# Patient Record
Sex: FEMALE | Race: BLACK OR AFRICAN AMERICAN | NOT HISPANIC OR LATINO | Employment: FULL TIME | URBAN - METROPOLITAN AREA
[De-identification: names, ages, dates, MRNs, and addresses within clinical notes are randomized per-mention and may not be internally consistent; named-entity substitution may affect disease eponyms.]

---

## 2018-02-02 PROBLEM — I50.21 ACUTE SYSTOLIC CONGESTIVE HEART FAILURE: Status: ACTIVE | Noted: 2018-02-02

## 2018-02-03 PROBLEM — R06.02 SOB (SHORTNESS OF BREATH): Status: ACTIVE | Noted: 2018-02-03

## 2018-02-03 PROBLEM — I50.21 ACUTE SYSTOLIC CONGESTIVE HEART FAILURE: Status: RESOLVED | Noted: 2018-02-02 | Resolved: 2018-02-03

## 2018-02-03 PROBLEM — J81.0 ACUTE PULMONARY EDEMA: Status: ACTIVE | Noted: 2018-02-03

## 2018-02-03 PROBLEM — R05.9 COUGH: Status: ACTIVE | Noted: 2018-02-03

## 2018-03-15 PROBLEM — I51.7 LVH (LEFT VENTRICULAR HYPERTROPHY): Status: ACTIVE | Noted: 2018-03-15

## 2018-03-15 PROBLEM — I42.0 DILATED CARDIOMYOPATHY: Status: ACTIVE | Noted: 2018-03-15

## 2018-03-15 PROBLEM — R94.31 PROLONGED Q-T INTERVAL ON ECG: Status: ACTIVE | Noted: 2018-03-15

## 2018-03-15 PROBLEM — I50.20 SYSTOLIC HEART FAILURE: Status: ACTIVE | Noted: 2018-02-02

## 2018-03-15 PROBLEM — I37.1 PULMONARY VALVE INSUFFICIENCY: Status: ACTIVE | Noted: 2018-03-15

## 2018-03-15 PROBLEM — I51.7 LEFT ATRIAL ENLARGEMENT: Status: ACTIVE | Noted: 2018-03-15

## 2018-03-15 PROBLEM — Z82.49 FAMILY HISTORY OF HEART DISEASE: Status: ACTIVE | Noted: 2018-03-15

## 2018-03-15 PROBLEM — E66.01 MORBID OBESITY WITH BMI OF 45.0-49.9, ADULT: Status: ACTIVE | Noted: 2018-03-15

## 2018-05-07 PROBLEM — J81.0 ACUTE PULMONARY EDEMA: Status: RESOLVED | Noted: 2018-02-03 | Resolved: 2018-05-07

## 2018-05-10 ENCOUNTER — CLINICAL SUPPORT (OUTPATIENT)
Dept: OCCUPATIONAL MEDICINE | Facility: CLINIC | Age: 23
End: 2018-05-10

## 2018-05-10 DIAGNOSIS — Z11.1 PPD SCREENING TEST: Primary | ICD-10-CM

## 2018-05-10 PROCEDURE — 86580 TB INTRADERMAL TEST: CPT | Mod: S$GLB,,,

## 2018-05-10 NOTE — PROGRESS NOTES
05/10/2018-- 1:20---PPD Skin Test to left forearm. Pt tolerated well. Informed Pt. To return in 2 days for reading

## 2018-06-14 PROBLEM — R06.09 DYSPNEA ON EXERTION: Status: ACTIVE | Noted: 2018-02-03

## 2018-08-09 ENCOUNTER — TELEPHONE (OUTPATIENT)
Dept: ADMINISTRATIVE | Facility: HOSPITAL | Age: 23
End: 2018-08-09

## 2019-02-20 PROBLEM — I35.1 NONRHEUMATIC AORTIC VALVE INSUFFICIENCY: Status: ACTIVE | Noted: 2019-02-20

## 2019-02-20 PROBLEM — I34.0 NON-RHEUMATIC MITRAL REGURGITATION: Status: ACTIVE | Noted: 2019-02-20

## 2019-10-05 PROBLEM — B37.31 VAGINAL CANDIDIASIS: Status: ACTIVE | Noted: 2019-10-05

## 2019-10-06 PROBLEM — I50.9 CHF (CONGESTIVE HEART FAILURE): Status: ACTIVE | Noted: 2018-02-02

## 2019-10-11 PROBLEM — I51.89 LEFT VENTRICULAR SYSTOLIC DYSFUNCTION: Status: RESOLVED | Noted: 2019-10-11 | Resolved: 2019-10-11

## 2019-10-11 PROBLEM — I50.20 HEART FAILURE WITH REDUCED EJECTION FRACTION, NYHA CLASS I: Status: ACTIVE | Noted: 2019-10-11

## 2019-10-11 PROBLEM — R06.09 DYSPNEA ON EXERTION: Status: RESOLVED | Noted: 2018-02-03 | Resolved: 2019-10-11

## 2019-10-11 PROBLEM — I51.7 LEFT VENTRICULAR ENLARGEMENT: Status: ACTIVE | Noted: 2019-10-11

## 2019-10-11 PROBLEM — I50.9 CHF (CONGESTIVE HEART FAILURE): Status: RESOLVED | Noted: 2018-02-02 | Resolved: 2019-10-11

## 2019-10-11 PROBLEM — I51.89 LEFT VENTRICULAR SYSTOLIC DYSFUNCTION: Status: ACTIVE | Noted: 2019-10-11

## 2019-10-11 PROBLEM — I07.1 TRICUSPID VALVE INSUFFICIENCY: Status: ACTIVE | Noted: 2019-10-11

## 2019-10-11 PROBLEM — E66.01 MORBID OBESITY: Status: ACTIVE | Noted: 2019-10-11

## 2019-10-11 PROBLEM — I51.7 RIGHT VENTRICULAR ENLARGEMENT: Status: ACTIVE | Noted: 2019-10-11

## 2019-10-11 PROBLEM — I51.9 LEFT VENTRICULAR SYSTOLIC DYSFUNCTION: Status: ACTIVE | Noted: 2019-10-11

## 2019-10-11 PROBLEM — I51.9 LEFT VENTRICULAR SYSTOLIC DYSFUNCTION: Status: RESOLVED | Noted: 2019-10-11 | Resolved: 2019-10-11

## 2019-11-06 ENCOUNTER — HOSPITAL ENCOUNTER (OUTPATIENT)
Dept: RADIOLOGY | Facility: HOSPITAL | Age: 24
Discharge: HOME | End: 2019-11-06
Attending: INTERNAL MEDICINE
Payer: MEDICAID

## 2019-11-06 DIAGNOSIS — I42.0 DILATED CARDIOMYOPATHY: ICD-10-CM

## 2019-11-06 DIAGNOSIS — I42.0 DILATED CARDIOMYOPATHY: Primary | ICD-10-CM

## 2019-11-06 DIAGNOSIS — I42.8 CARDIOMYOPATHY, NONISCHEMIC: ICD-10-CM

## 2019-11-06 PROCEDURE — 25500020 PHARM REV CODE 255: Performed by: INTERNAL MEDICINE

## 2019-11-06 PROCEDURE — A9577 INJ MULTIHANCE: HCPCS | Performed by: INTERNAL MEDICINE

## 2019-11-06 PROCEDURE — 75561 CARDIAC MRI FOR MORPH W/DYE: CPT | Mod: 26,,, | Performed by: PEDIATRICS

## 2019-11-06 PROCEDURE — 75561 MRI CARDIAC MORPHOLOGY FUNCTION W WO: ICD-10-PCS | Mod: 26,,, | Performed by: PEDIATRICS

## 2019-11-06 PROCEDURE — 75561 CARDIAC MRI FOR MORPH W/DYE: CPT | Mod: TC

## 2019-11-06 RX ADMIN — GADOBENATE DIMEGLUMINE 20 ML: 529 INJECTION, SOLUTION INTRAVENOUS at 12:11

## 2020-01-23 ENCOUNTER — OFFICE VISIT (OUTPATIENT)
Dept: URGENT CARE | Facility: CLINIC | Age: 25
End: 2020-01-23
Payer: MEDICAID

## 2020-01-23 VITALS
OXYGEN SATURATION: 98 % | WEIGHT: 270 LBS | SYSTOLIC BLOOD PRESSURE: 155 MMHG | HEIGHT: 62 IN | BODY MASS INDEX: 49.69 KG/M2 | RESPIRATION RATE: 18 BRPM | TEMPERATURE: 99 F | HEART RATE: 106 BPM | DIASTOLIC BLOOD PRESSURE: 104 MMHG

## 2020-01-23 DIAGNOSIS — B96.89 BACTERIAL SINUSITIS: Primary | ICD-10-CM

## 2020-01-23 DIAGNOSIS — J32.9 BACTERIAL SINUSITIS: Primary | ICD-10-CM

## 2020-01-23 PROCEDURE — 99204 OFFICE O/P NEW MOD 45 MIN: CPT | Mod: S$GLB,,, | Performed by: NURSE PRACTITIONER

## 2020-01-23 PROCEDURE — 99204 PR OFFICE/OUTPT VISIT, NEW, LEVL IV, 45-59 MIN: ICD-10-PCS | Mod: S$GLB,,, | Performed by: NURSE PRACTITIONER

## 2020-01-23 RX ORDER — PREDNISONE 10 MG/1
10 TABLET ORAL DAILY
Qty: 5 TABLET | Refills: 0 | Status: SHIPPED | OUTPATIENT
Start: 2020-01-23 | End: 2020-01-28

## 2020-01-23 RX ORDER — AMOXICILLIN AND CLAVULANATE POTASSIUM 875; 125 MG/1; MG/1
1 TABLET, FILM COATED ORAL 2 TIMES DAILY
Qty: 20 TABLET | Refills: 0 | Status: SHIPPED | OUTPATIENT
Start: 2020-01-23 | End: 2020-02-02

## 2020-01-23 NOTE — PATIENT INSTRUCTIONS
Sinusitis (Antibiotic Treatment)    The sinuses are air-filled spaces within the bones of the face. They connect to the inside of the nose. Sinusitis is an inflammation of the tissue lining the sinus cavity. Sinus inflammation can occur during a cold. It can also be due to allergies to pollens and other particles in the air. Sinusitis can cause symptoms of sinus congestion and fullness. A sinus infection causes fever, headache and facial pain. There is often green or yellow drainage from the nose or into the back of the throat (post-nasal drip). You have been given antibiotics to treat this condition.  Home care:  · Take the full course of antibiotics as instructed. Do not stop taking them, even if you feel better.  · Drink plenty of water, hot tea, and other liquids. This may help thin mucus. It also may promote sinus drainage.  · Heat may help soothe painful areas of the face. Use a towel soaked in hot water. Or,  the shower and direct the hot spray onto your face. Using a vaporizer along with a menthol rub at night may also help.   · An expectorant containing guaifenesin may help thin the mucus and promote drainage from the sinuses.  · Over-the-counter decongestants may be used unless a similar medicine was prescribed. Nasal sprays work the fastest. Use one that contains phenylephrine or oxymetazoline. First blow the nose gently. Then use the spray. Do not use these medicines more often than directed on the label or symptoms may get worse. You may also use tablets containing pseudoephedrine. Avoid products that combine ingredients, because side effects may be increased. Read labels. You can also ask the pharmacist for help. (NOTE: Persons with high blood pressure should not use decongestants. They can raise blood pressure.)  · Over-the-counter antihistamines may help if allergies contributed to your sinusitis.    · Do not use nasal rinses or irrigation during an acute sinus infection, unless told to by  your health care provider. Rinsing may spread the infection to other sinuses.  · Use acetaminophen or ibuprofen to control pain, unless another pain medicine was prescribed. (If you have chronic liver or kidney disease or ever had a stomach ulcer, talk with your doctor before using these medicines. Aspirin should never be used in anyone under 18 years of age who is ill with a fever. It may cause severe liver damage.)  · Don't smoke. This can worsen symptoms.  Follow-up care  Follow up with your healthcare provider or our staff if you are not improving within the next week.  When to seek medical advice  Call your healthcare provider if any of these occur:  · Facial pain or headache becoming more severe  · Stiff neck  · Unusual drowsiness or confusion  · Swelling of the forehead or eyelids  · Vision problems, including blurred or double vision  · Fever of 100.4ºF (38ºC) or higher, or as directed by your healthcare provider  · Seizure  · Breathing problems  · Symptoms not resolving within 10 days  Date Last Reviewed: 4/13/2015  © 7585-9984 [employer] [address] [place] [address]. All rights reserved. This information is not intended as a substitute for professional medical care. Always follow your healthcare professional's instructions.      Upper Respiratory Infections    The common cold/ viral upper respiratory infection is an acute, self-limiting infection of the upper respiratory tract characterized by variable degrees of sneezing, nasal congestion and discharge (rhinorrhea), sore throat, cough, low grade fever, headache, and malaise.    Symptoms usually peak on day 2 to 3 of illness and then gradually improve over 7 to 14 days.  A cough may linger for 3 to 4 weeks but should steadily improve over time.       The following information is provided to help you in treating upper respiratory infections.    Decongestant Nasal Sprays  Over-the-counter decongestant nasal spray such as Afrin,  may be helpful as an initial step in treating upper respiratory infections. This spray can be used for up to approximately 3 days and is used no more than twice per day. Topical nasal decongestant spray for longer than 5 days will result in a physical addiction, in which the nasal lining will become significantly swollen and irritated until the spray is used again.     Nasal Saline  Nasal saline is available over the counter. There are several different commercial preparations such as Ocean spray and Ayr spray. There is no limit on the use of Nasal saline. Saline is used by snorting the mist up into the nose then later gently blowing the nose to get rid of any secretions that it has loosened.    Nasal Steroids  Nasal steroid medications such as Flonase are useful for upper respiratory infections, allergies, and sensitivities to airborne irritants. Unfortunately, they do not begin to work for 1-2 days, and they do not reach their maximum benefit for approximately 2-3 weeks. Initial therapy is typically 2 puffs per nostril twice per day. This should be used for only a few days, then the maintenance dosage is one or two puffs per nostril once per day. This can be done at any time of the day. The most effective way to use any nasal medication is to look down at your toes when spraying it in. Aim slightly away from the septum (dividing plate between the nostrils), and gently inhale. This ensures that the spray will go into the sinus cavities and not straight up into the nose. A good way to avoid spraying onto the septum is to use the right hand to spray into the left nostril, and vice versa for the right nostril. Occasionally, nasal steroids can increase the risk of nosebleeds, but in general they are very well tolerated and effective medications.    Antihistamines  Antihistamines are available both over-the-counter and as a prescription. There are also various decongestant and antihistamine combinations available such  as Claritin, Allegra, and Zyrtec. It is best to take any antihistamine-decongestant combination in the morning to avoid insomnia. Zyrtec should probably be taken at night, in order to reduce the chance of sleepiness during the daytime. If there is a significant infection present and secretions are already thickened, it is recommended to discontinue antihistamines and use a mucous thinner/decongestant combination.    Mucous Thinners and Decongestants  Mucous thinners and decongestants are used to shrink down the tissues and promote sinus drainage. There are multiple prescription and over-the-counter varieties available. A mucous thinner will tend to be drying unless you are also drinking plenty of water when taking these. If you have high blood pressure, it is very important to monitor your pressure while on decongestants. The mucous thinner/decongestant combinations are typically given twice per day. However, some people will be unable to tolerate these at night and should only take them once per day.    Oral Steroids  Oral steroids can be used with more sever infections. Often, they are the only medications that will reduce the symptoms of pressure and allow the nasal sinuses to drain. These are best taken on a full stomach and earlier in the day is better. They may give you some irritability, stomach upset, or hyperactivity. This can also interfere with sleep. A person who has high blood pressure or diabetes needs to be very careful to monitor their pressure or blood glucose while taking steroids. Steroids can have multiple side effects when taken long-term, but short-term doses are very well tolerated and extremely effective in controlling the symptoms associated with acute and chronic sinus infections, severe allergies, or nasal polyps. The only significant side effect of note with oral steroids is the extraordinarily uncommon occurrence of damage to the hip cartilage, which is very rare and is usually  associated with long-term usage of steroids. The use of steroids for greater than approximately seven days requires a tapering down in order to discontinue them. You should not abruptly stop your steroid if you have been taking the same dose for greater than one week.    Antibiotic Treatment  Finally, when all of these other measures have failed, and a bacterial infection is present, an antibiotic will be prescribed. The most common symptoms of acute sinusitis of a bacterial nature are pain, pressure, and thick and colored nasal drainage. However, not all colored drainage means that there is a bacterial infection present. According to the Center for Disease Control, only 2% of colds will progress to result in bacterial sinusitis. Most upper respiratory infections should NOT be treated with antibiotics. Antibiotics should be reserved for upper respiratory infections which last longer than 10 days, or which worsen after 4 or 5 days of treatment. The use of antibiotics for nonbacterial upper respiratory infections has resulted in a severe problem with the emergence of bacteria which are resistant to multiple forms of antibiotics, and some bacteria are currently only treatable with intravenous antibiotics.    Body Aches/Pains/Fever  For patients who are not allergic to and are not on anticoagulants, you can alternate Tylenol and Motrin every 4-6 hours for fever above 100.4F and/or pain.  For patients who are allergic or intolerant to NSAIDS, have gastritis, gastric ulcers, or history of GI bleeds, are pregnant, or are on anticoagulant therapy, you can take Tylenol every 4 hours as needed for fever above 100.4F and/or pain.     Maintain adequate hydration -  Rest and keep yourself/patient well hydrated. For adults, it is recommended to drink at least 8-10 glasses of water daily.  This may help thin secretions and soothe the respiratory mucosa.     Drink Hot Liquids (coffee, water, tea, hot chocolate or soup). Put liquid  in a Mug and place in Microwave for 2 to 3 minutes. CHANGE THE CUP THAT WAS USED IN THE MICROWAVE SO AS NOT TO BURN YOUR MOUTH.  Sniff the steam from the cup and sip the heated liquid TEN TO TWELVE TIMES A DAY for 5 to 7 Days.    COUGH  A viral cough may linger for 3 to 4 weeks but should steadily improve over time.   Coughing is the body's natural way to clear mucus and help get rid of bacteria and viruses. Therefore, cough suppressants are usually not recomended.  Common cough suppressants include syrups with the ingredient dextromethorphan or DM, available over-the-counter, or prescription syrups containing codeine or hydrocone.  There is no proven benefit and have potential harms.       ?Honey may be beneficial, especially on nocturnal cough.   1 to 2 teaspoons can be taken straight or diluted in tea, juice or other liquid.    The antioxidants in honey are an important contributor to its decongestant properties. Generally speaking, darker honey contains more antioxidants. Buckwheat and avocado honey are particularly good choices. If these honeys are not available in your area, choose the darkest honey you can find.        1.  Take all medications as directed. If you have been prescribed antibiotics, make sure to complete them.   2.  Rest and keep yourself/patient well hydrated. For adults, it is recommended to drink at least 8-10 glasses of water daily.   3.  For patients above 6 months of age who are not allergic to and are not on anticoagulants, you can alternate Tylenol and Motrin every 4-6 hours for fever above 100.4F and/or pain.  For patients less than 6 months of age, allergic to or intolerant to NSAIDS, have gastritis, gastric ulcers, or history of GI bleeds, are pregnant, or are on anticoagulant therapy, you can take Tylenol every 4 hours as needed for fever above 100.4F and/or pain.   4. You should schedule a follow-up appointment with your Primary Care Provider/Pediatrician for recheck in 2-3 days or  as directed at this visit.   5.  If your condition fails to improve in a timely manner, you should receive another evaluation by your Primary Care Provider/Pediatrician to discuss your concerns or return to urgent care for a recheck.  If your condition worsens at any time, you should report immediately to your nearest Emergency Department for further evaluation. **You must understand that you have received Urgent Care treatment only and that you may be released before all of your medical problems are known or treated. You, the patient, are responsible to arrange for follow-up care as instructed.

## 2020-01-23 NOTE — LETTER
January 23, 2020      [facility]  [place]  [address]  [place] [address]  Phone: [phone]  Fax: [phone]       Patient: Brittni Morin   YOB: 1995  Date of Visit: 01/23/2020    To Whom It May Concern:    Gayle Morin  was at [facility] on 01/23/2020. She may return to work/school on 1/24/20 with no restrictions. If you have any questions or concerns, or if I can be of further assistance, please do not hesitate to contact me.    Sincerely,    Princess Villanueva NP

## 2020-03-25 PROBLEM — E66.01 MORBID OBESITY: Status: RESOLVED | Noted: 2019-10-11 | Resolved: 2020-03-25

## 2020-03-25 PROBLEM — I42.8 CARDIOMYOPATHY, NONISCHEMIC: Status: ACTIVE | Noted: 2020-03-25

## 2020-03-25 PROBLEM — E66.01 CLASS 2 SEVERE OBESITY DUE TO EXCESS CALORIES WITH SERIOUS COMORBIDITY IN ADULT: Status: ACTIVE | Noted: 2020-03-25

## 2020-03-25 PROBLEM — E66.01 MORBID OBESITY WITH BMI OF 45.0-49.9, ADULT: Status: RESOLVED | Noted: 2018-03-15 | Resolved: 2020-03-25

## 2020-03-25 PROBLEM — E66.812 CLASS 2 SEVERE OBESITY DUE TO EXCESS CALORIES WITH SERIOUS COMORBIDITY IN ADULT: Status: ACTIVE | Noted: 2020-03-25

## 2020-07-21 ENCOUNTER — NURSE TRIAGE (OUTPATIENT)
Dept: ADMINISTRATIVE | Facility: CLINIC | Age: 25
End: 2020-07-21

## 2020-07-21 NOTE — TELEPHONE ENCOUNTER
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  She states that she has SOB on exertion and a stuffy nose.  Denies having a fever at this time.  Patient states that she is currently at work.  Advised patient to be seen at the urgent care or ER today.  Also advised patient to call her employer and inform them that she currently has symptoms. Patient states that she has no relief.  Patient further advised to be seen patient verbalized understanding.  Reiterated CDC guidelines.     Reason for Disposition   MILD difficulty breathing (e.g., minimal/no SOB at rest, SOB with walking, pulse <100)    Additional Information   Negative: SEVERE difficulty breathing (e.g., struggling for each breath, speaks in single words)   Negative: Difficult to awaken or acting confused (e.g., disoriented, slurred speech)   Negative: Bluish (or gray) lips or face now   Negative: Shock suspected (e.g., cold/pale/clammy skin, too weak to stand, low BP, rapid pulse)   Negative: Sounds like a life-threatening emergency to the triager   Negative: SEVERE or constant chest pain or pressure (Exception: mild central chest pain, present only when coughing)   Negative: MODERATE difficulty breathing (e.g., speaks in phrases, SOB even at rest, pulse 100-120)   Negative: Patient sounds very sick or weak to the triager    Protocols used: CORONAVIRUS (COVID-19) DIAGNOSED OR [id number]

## 2020-07-22 ENCOUNTER — NURSE TRIAGE (OUTPATIENT)
Dept: ADMINISTRATIVE | Facility: CLINIC | Age: 25
End: 2020-07-22

## 2020-07-22 ENCOUNTER — LAB VISIT (OUTPATIENT)
Dept: PRIMARY CARE CLINIC | Facility: OTHER | Age: 25
End: 2020-07-22
Attending: INTERNAL MEDICINE
Payer: COMMERCIAL

## 2020-07-22 DIAGNOSIS — R05.9 COUGH: ICD-10-CM

## 2020-07-22 DIAGNOSIS — Z03.818 ENCOUNTER FOR OBSERVATION FOR SUSPECTED EXPOSURE TO OTHER BIOLOGICAL AGENTS RULED OUT: ICD-10-CM

## 2020-07-22 DIAGNOSIS — R06.02 SHORTNESS OF BREATH: ICD-10-CM

## 2020-07-22 PROCEDURE — U0003 INFECTIOUS AGENT DETECTION BY NUCLEIC ACID (DNA OR RNA); SEVERE ACUTE RESPIRATORY SYNDROME CORONAVIRUS 2 (SARS-COV-2) (CORONAVIRUS DISEASE [COVID-19]), AMPLIFIED PROBE TECHNIQUE, MAKING USE OF HIGH THROUGHPUT TECHNOLOGIES AS DESCRIBED BY CMS-2020-01-R: HCPCS

## 2020-07-22 NOTE — TELEPHONE ENCOUNTER
Pt states she was in contact with a positive Covid pt. She is now experiencing mild SOB and cough for 3 days. No fever. No s/s of distress at this time. Pt would like to be tested. Advised about community testing sites, pt is understanding in next's steps in 14 day quarantine, pending test results.     Reason for Disposition   [1] Symptoms of COVID-19 (e.g., cough, fever, SOB, or others) AND [2] lives in an area with community spread   COVID-19 Testing, questions about    Additional Information   Negative: COVID-19 has been diagnosed by a healthcare provider (HCP)   Negative: COVID-19 lab test positive   Negative: SEVERE difficulty breathing (e.g., struggling for each breath, speaks in single words)   Negative: Difficult to awaken or acting confused (e.g., disoriented, slurred speech)   Negative: Bluish (or gray) lips or face now   Negative: Shock suspected (e.g., cold/pale/clammy skin, too weak to stand, low BP, rapid pulse)   Negative: Sounds like a life-threatening emergency to the triager   Negative: [1] COVID-19 exposure AND [2] NO symptoms   Negative: COVID-19 and Breastfeeding, questions about   Negative: [1] Adult with possible COVID-19 symptoms AND [2] triager concerned about severity of symptoms or other causes   Negative: SEVERE or constant chest pain or pressure (Exception: mild central chest pain, present only when coughing)   Negative: MODERATE difficulty breathing (e.g., speaks in phrases, SOB even at rest, pulse 100-120)   Negative: Patient sounds very sick or weak to the triager   Negative: Chest pain   Negative: MILD difficulty breathing (e.g., minimal/no SOB at rest, SOB with walking, pulse <100)   Negative: Fever > 103 F (39.4 C)   Negative: [1] Fever > 101 F (38.3 C) AND [2] age > 60   Negative: [1] Fever > 100.0 F (37.8 C) AND [2] bedridden (e.g., nursing home patient, CVA, chronic illness, recovering from surgery)   Negative: HIGH RISK patient (e.g., age > 64 years,  diabetes, heart or lung disease, weak immune system)   Negative: [1] COVID-19 infection suspected by caller or triager AND [2] mild symptoms (cough, fever, or others) AND [3] no complications or SOB   Negative: Fever present > 3 days (72 hours)   Negative: [1] Fever returns after gone for over 24 hours AND [2] symptoms worse or not improved   Negative: [1] Continuous (nonstop) coughing interferes with work or school AND [2] no improvement using cough treatment per protocol   Negative: Cough present > 3 weeks   Negative: [1] COVID-19 diagnosed by positive lab test AND [2] mild symptoms (e.g., cough, fever, others) AND [3] no complications or SOB   Negative: [1] COVID-19 diagnosed by HCP (doctor, NP or PA) AND [2] mild symptoms (e.g., cough, fever, others) AND [3] no complications or SOB   Negative: COVID-19 Home Isolation, questions about    Protocols used: CORONAVIRUS (COVID-19) EXPOSURE-A-OH, CORONAVIRUS (COVID-19) DIAGNOSED OR [id number]

## 2020-07-22 NOTE — TELEPHONE ENCOUNTER
Patient states that she has been in direct contact of Covid + people. Was tested today at a Community Site. States her employers are requiring a note for wok. States her PCP Office are not able to provide the letter. Requesting information to get a letter to return to work.  Advised [facility]. Understanding verbalized.    Reason for Disposition   COVID -19, questions about    Protocols used: CORONAVIRUS (COVID-19) - EXPOSURE-A-AH

## 2020-07-23 ENCOUNTER — OFFICE VISIT (OUTPATIENT)
Dept: URGENT CARE | Facility: CLINIC | Age: 25
End: 2020-07-23
Payer: COMMERCIAL

## 2020-07-23 VITALS
TEMPERATURE: 99 F | BODY MASS INDEX: 49.69 KG/M2 | SYSTOLIC BLOOD PRESSURE: 151 MMHG | WEIGHT: 270 LBS | HEART RATE: 107 BPM | DIASTOLIC BLOOD PRESSURE: 104 MMHG | RESPIRATION RATE: 18 BRPM | HEIGHT: 62 IN | OXYGEN SATURATION: 97 %

## 2020-07-23 DIAGNOSIS — Z20.822 CLOSE EXPOSURE TO COVID-19 VIRUS: Primary | ICD-10-CM

## 2020-07-23 DIAGNOSIS — Z71.89 EDUCATED ABOUT COVID-19 VIRUS INFECTION: ICD-10-CM

## 2020-07-23 PROCEDURE — 99214 PR OFFICE/OUTPT VISIT, EST, LEVL IV, 30-39 MIN: ICD-10-PCS | Mod: S$GLB,,, | Performed by: NURSE PRACTITIONER

## 2020-07-23 PROCEDURE — 99214 OFFICE O/P EST MOD 30 MIN: CPT | Mod: S$GLB,,, | Performed by: NURSE PRACTITIONER

## 2020-07-23 NOTE — LETTER
[address], [address] ? [place], [address] ? Phone [phone] ? Fax [phone]           Return to Work/School    Patient: Brittni Morin  YOB: 1995   Date: 07/23/2020      To Whom It May Concern:     Brittni Morin was in contact with/seen in my office on 07/23/2020. COVID-19 is present in our communities across the [facility]. Not all patients are eligible or appropriate to be tested. In this situation, your employee meets the following criteria:     Brittni Morin has met the criteria for COVID-19 testing ln 7/22/2020 based upon symptoms, travel, and/or potential exposure. The test has been completed and is pending results at this time. During this time the employee is not able to work and should be quarantined per the Centers for Disease Control timelines.      If you have any questions or concerns, or if I can be of further assistance, please do not hesitate to contact me.     Sincerely,        Lizz Baca NP

## 2020-07-24 NOTE — PATIENT INSTRUCTIONS
Follow up with your doctor in a few days.  Return to the urgent care or go to the ER if symptoms get worse.

## 2020-07-24 NOTE — PROGRESS NOTES
Pt comes in for a work note. Pt was tested for COVID19 on Wednesday at a [facility] site.

## 2020-07-25 DIAGNOSIS — U07.1 COVID-19 VIRUS DETECTED: ICD-10-CM

## 2020-07-25 LAB — SARS-COV-2 RNA RESP QL NAA+PROBE: POSITIVE

## 2020-07-25 NOTE — PROGRESS NOTES
"Subjective:       Patient ID: Brittni Morin is a 25 y.o. female.    Vitals:  height is 5' 2" (1.575 m) and weight is 122.5 kg (270 lb). Her temperature is 98.5 °F (36.9 °C). Her blood pressure is 151/104 (abnormal) and her pulse is 107. Her respiration is 18 and oxygen saturation is 97%.     Chief Complaint: Letter for School/Work    Sinus Problem  This is a new problem. The current episode started in the past 7 days. The problem is unchanged. There has been no fever. Associated symptoms include congestion, coughing and a sore throat. Pertinent negatives include no shortness of breath.       Constitution: Negative.   HENT: Positive for congestion and sore throat.    Neck: negative.   Cardiovascular: Negative.    Eyes: Negative.    Respiratory: Positive for cough. Negative for chest tightness, sputum production, COPD, shortness of breath and wheezing.    Gastrointestinal: Negative.    Genitourinary: Negative.    Musculoskeletal: Negative.    Allergic/Immunologic: Negative.    Neurological: Negative.        Objective:      Physical Exam   Constitutional:  Non-toxic appearance. She does not appear ill. No distress.   HENT:   Mouth/Throat: Posterior oropharyngeal erythema present. No oropharyngeal exudate or posterior oropharyngeal edema.   Pulmonary/Chest: No respiratory distress.   Skin: Skin is not diaphoretic.       Patient was seen limited PE due to state of emergency for the COVID-19 outbreak.    Assessment:       1. Close Exposure to Covid-19 Virus    2. Educated About Covid-19 Virus Infection        Plan:     patient concerned with mild allergy/sinus symptoms and positive close exposure. Testing pending from 7/22/2020- work requiring note stating she was tested.  Work note provided to quarantine while pending test results.       Close Exposure to Covid-19 Virus    Educated About Covid-19 Virus Infection           "

## 2020-07-29 ENCOUNTER — TELEPHONE (OUTPATIENT)
Dept: URGENT CARE | Facility: CLINIC | Age: 25
End: 2020-07-29

## 2020-07-30 NOTE — TELEPHONE ENCOUNTER
Covid19 positive.  Results given to the patient.  Patient completed 10 days of quarantine.  She is symptom free for more than 48 hr.  She can go back to work.

## 2021-05-04 ENCOUNTER — PATIENT MESSAGE (OUTPATIENT)
Dept: RESEARCH | Facility: HOSPITAL | Age: 26
End: 2021-05-04

## 2021-05-10 ENCOUNTER — PATIENT MESSAGE (OUTPATIENT)
Dept: RESEARCH | Facility: HOSPITAL | Age: 26
End: 2021-05-10

## 2024-01-04 ENCOUNTER — OCCUPATIONAL HEALTH (OUTPATIENT)
Dept: URGENT CARE | Facility: CLINIC | Age: 29
End: 2024-01-04

## 2024-01-04 VITALS
BODY MASS INDEX: 47.34 KG/M2 | HEART RATE: 102 BPM | HEIGHT: 63 IN | OXYGEN SATURATION: 96 % | SYSTOLIC BLOOD PRESSURE: 134 MMHG | TEMPERATURE: 98 F | DIASTOLIC BLOOD PRESSURE: 91 MMHG | RESPIRATION RATE: 16 BRPM | WEIGHT: 267.19 LBS

## 2024-01-04 DIAGNOSIS — Z00.00 ENCOUNTER FOR PHYSICAL EXAMINATION: Primary | ICD-10-CM

## 2024-01-04 LAB
CTP QC/QA: YES
GLUCOSE SERPL-MCNC: 93 MG/DL (ref 70–110)
POC 10 PANEL DRUG SCREEN: NEGATIVE

## 2024-01-04 PROCEDURE — 99499 UNLISTED E&M SERVICE: CPT | Mod: S$GLB,,, | Performed by: PHYSICIAN ASSISTANT

## 2024-01-04 PROCEDURE — 82962 GLUCOSE BLOOD TEST: CPT | Mod: S$GLB,,, | Performed by: PHYSICIAN ASSISTANT

## 2024-01-04 PROCEDURE — 80305 DRUG TEST PRSMV DIR OPT OBS: CPT | Mod: S$GLB,,, | Performed by: PHYSICIAN ASSISTANT

## 2025-04-22 ENCOUNTER — LAB VISIT (OUTPATIENT)
Dept: LAB | Facility: HOSPITAL | Age: 30
End: 2025-04-22
Attending: EMERGENCY MEDICINE

## 2025-04-22 DIAGNOSIS — Z02.1 ENCOUNTER FOR PRE-EMPLOYMENT EXAMINATION: ICD-10-CM

## 2025-04-22 LAB
HBV SURFACE AB SER-ACNC: <3 MIU/ML
HBV SURFACE AB SERPL IA-ACNC: NORMAL M[IU]/ML

## 2025-04-22 PROCEDURE — 86765 RUBEOLA ANTIBODY: CPT

## 2025-04-22 PROCEDURE — 36415 COLL VENOUS BLD VENIPUNCTURE: CPT

## 2025-04-22 PROCEDURE — 86706 HEP B SURFACE ANTIBODY: CPT | Mod: 59

## 2025-04-22 PROCEDURE — 86787 VARICELLA-ZOSTER ANTIBODY: CPT

## 2025-04-22 PROCEDURE — 86735 MUMPS ANTIBODY: CPT

## 2025-04-22 PROCEDURE — 86762 RUBELLA ANTIBODY: CPT

## 2025-04-22 PROCEDURE — 86480 TB TEST CELL IMMUN MEASURE: CPT

## 2025-04-23 LAB
MITOGEN MINUS NIL (OHS): 9.08
MUMPS IGG (OHS): >300 AU/ML
MUMPS IGG INTERPRETATION (OHS): POSITIVE
NIL TB SYNCED (OHS): 0
QUANTIFERON GOLD INTERP (OHS): NEGATIVE
RUBEOLA (MEASLES) IGG (OHS): >300 AU/ML
RUBEOLA IGG INTERP. (OHS): POSITIVE
RUBV IGG SER-ACNC: 44.8 IU/ML
RUBV IGG SER-IMP: REACTIVE
TB1 AG MINUS NIL (OHS): 0.02
TB2 AG MINUS NIL (OHS): 0.01
V.ZOSTER IGG INTERP (OHS): POSITIVE
VARICELLA ZOSTER IGG (OHS): 15.5 S/CO